# Patient Record
Sex: MALE | Race: WHITE | NOT HISPANIC OR LATINO
[De-identification: names, ages, dates, MRNs, and addresses within clinical notes are randomized per-mention and may not be internally consistent; named-entity substitution may affect disease eponyms.]

---

## 2017-01-10 ENCOUNTER — APPOINTMENT (OUTPATIENT)
Dept: CARDIOLOGY | Facility: CLINIC | Age: 42
End: 2017-01-10

## 2017-01-10 VITALS — SYSTOLIC BLOOD PRESSURE: 120 MMHG | HEART RATE: 76 BPM | RESPIRATION RATE: 18 BRPM | DIASTOLIC BLOOD PRESSURE: 80 MMHG

## 2017-01-10 VITALS — BODY MASS INDEX: 27.32 KG/M2 | WEIGHT: 170 LBS | HEIGHT: 66 IN

## 2017-06-07 ENCOUNTER — INPATIENT (INPATIENT)
Facility: HOSPITAL | Age: 42
LOS: 0 days | Discharge: HOME | End: 2017-06-08

## 2017-06-28 DIAGNOSIS — R07.9 CHEST PAIN, UNSPECIFIED: ICD-10-CM

## 2017-06-28 DIAGNOSIS — Z82.49 FAMILY HISTORY OF ISCHEMIC HEART DISEASE AND OTHER DISEASES OF THE CIRCULATORY SYSTEM: ICD-10-CM

## 2017-06-28 DIAGNOSIS — R06.02 SHORTNESS OF BREATH: ICD-10-CM

## 2017-06-28 DIAGNOSIS — R07.89 OTHER CHEST PAIN: ICD-10-CM

## 2017-06-28 DIAGNOSIS — I49.8 OTHER SPECIFIED CARDIAC ARRHYTHMIAS: ICD-10-CM

## 2017-06-28 DIAGNOSIS — E78.5 HYPERLIPIDEMIA, UNSPECIFIED: ICD-10-CM

## 2017-06-28 DIAGNOSIS — R00.2 PALPITATIONS: ICD-10-CM

## 2017-06-28 DIAGNOSIS — I10 ESSENTIAL (PRIMARY) HYPERTENSION: ICD-10-CM

## 2017-07-11 ENCOUNTER — APPOINTMENT (OUTPATIENT)
Dept: CARDIOLOGY | Facility: CLINIC | Age: 42
End: 2017-07-11

## 2017-07-31 ENCOUNTER — OUTPATIENT (OUTPATIENT)
Dept: OUTPATIENT SERVICES | Facility: HOSPITAL | Age: 42
LOS: 1 days | Discharge: HOME | End: 2017-07-31

## 2017-07-31 DIAGNOSIS — N39.0 URINARY TRACT INFECTION, SITE NOT SPECIFIED: ICD-10-CM

## 2017-11-10 ENCOUNTER — APPOINTMENT (OUTPATIENT)
Dept: UROLOGY | Facility: CLINIC | Age: 42
End: 2017-11-10

## 2017-11-15 ENCOUNTER — APPOINTMENT (OUTPATIENT)
Dept: UROLOGY | Facility: CLINIC | Age: 42
End: 2017-11-15
Payer: MEDICAID

## 2017-11-15 VITALS
WEIGHT: 170 LBS | HEART RATE: 83 BPM | HEIGHT: 66 IN | DIASTOLIC BLOOD PRESSURE: 74 MMHG | SYSTOLIC BLOOD PRESSURE: 115 MMHG | BODY MASS INDEX: 27.32 KG/M2

## 2017-11-15 DIAGNOSIS — Z82.49 FAMILY HISTORY OF ISCHEMIC HEART DISEASE AND OTHER DISEASES OF THE CIRCULATORY SYSTEM: ICD-10-CM

## 2017-11-15 DIAGNOSIS — R31.9 HEMATURIA, UNSPECIFIED: ICD-10-CM

## 2017-11-15 DIAGNOSIS — Z83.3 FAMILY HISTORY OF DIABETES MELLITUS: ICD-10-CM

## 2017-11-15 DIAGNOSIS — G90.50 COMPLEX REGIONAL PAIN SYNDROME I, UNSPECIFIED: ICD-10-CM

## 2017-11-15 DIAGNOSIS — Z83.49 FAMILY HISTORY OF OTHER ENDOCRINE, NUTRITIONAL AND METABOLIC DISEASES: ICD-10-CM

## 2017-11-15 DIAGNOSIS — Z84.1 FAMILY HISTORY OF DISORDERS OF KIDNEY AND URETER: ICD-10-CM

## 2017-11-15 PROCEDURE — 99203 OFFICE O/P NEW LOW 30 MIN: CPT

## 2017-11-15 RX ORDER — MESALAMINE 1.2 G/1
1.2 TABLET, DELAYED RELEASE ORAL DAILY
Refills: 0 | Status: COMPLETED | COMMUNITY
End: 2017-11-15

## 2017-11-15 NOTE — LETTER BODY
[Dear  ___] : Dear  [unfilled], [Attached please find my note.] : Attached please find my note. [Thank you very much for allowing me to participate in the care of this patient. If you have any questions, please do not hesitate to contact me] : Thank you very much for allowing me to participate in the care of this patient. If you have any questions, please do not hesitate to contact me. [FreeTextEntry2] : Carlos A Bauman MD\[facility]\[place], [place]

## 2017-11-15 NOTE — HISTORY OF PRESENT ILLNESS
[Urinary Retention] : urinary retention [Urinary Urgency] : urinary urgency [Urinary Frequency] : urinary frequency [Nocturia] : nocturia [Weak Stream] : weak stream [Erectile Dysfunction] : Erectile Dysfunction [Dysuria] : dysuria [None] : There is no radiation [Gradual] : gradual [___ Month(s) Ago] : [unfilled] month(s) ago [Occasional] : occasionally [Unchanged] : unchanged [FreeTextEntry1] : Fabio is very pleasant 41-year-old male who came in for several urologic issues. On the simplest side he has lower urinary tract symptoms with the AUA symptom score having the following numbers. Incomplete emptying – three\par Frequency – four,\par Intermittency – zero\par Urgency – two\par Slow stream – five\par Straining – two\par He wakes up at least once a night living in a total AUA score of 16/1 with the Q OL equal to five. Three months ago he had some blood in his urine and when he urinates now he has some initial dysuria.\par \par He has associated issues, which began about a year ago when he broke his right ankle and developed reflex sympathetic dystrophy on that side of sexual dysfunction. He is  seven years; he said different women over time and is with his current lady friend for about a year. There considering marriage and having their own child, he has a three children from his first marriage she has no children and she’s only 26. At present he can get just about hard enough to penetrate but it is easily bendable are perhaps averaging about a 60 and it takes a lot of work for her to get him hard. If he gets distracted he loses it and though with a lot of effort he can come to ejaculation it’s not really a forceful one it’s for the leaks out and he does not noticed any associated orgasm. He’s had orgasms in the past he knows what they are and would like to have them again. He can get in all the time the last a couple of minutes and he has not had a different partner since he met his current lady friend. When he self stimulates the same thing happens and he hasn’t been using audiovisual sexual stimulation for a while. If he’s tired the symptoms are even more pronounced. he does not know what precipitated this though it’s gotten gradually worse and as above begin after the reflex sympathetic dystrophy started he says his penis does and the curve or bend though they may be a slight left-sided tilt and if he had ability he want to try more often. Currently he is afraid to try because it doesn’t work. He does have sexual fantasies his girlfriend is quite sexual and enjoys intimacy.\par \par A final issue is a left small spermatocele which is quite tense and bothersome when it’s touch. He’s been told in the past it just takes some anti-inflammatories he wonders if there’s anything else we can do. He did tell me that the he may still want to have children and that obviously has a large part to play as to how we may or may not treat this this.\par \par In addition to the reflex sympathetic dystrophy he has GERD and a history of ulcerative colitis. He was on a medication named Lialda but he stopped that about three months ago with knowledge of his physician and at his own decision. He suffers from intermittent headaches and chest pain in fact was hospitalized this past summer due to cardiac symptoms which turned out to be nothing. He does not snore but he has trouble sleeping is tired in the daytime has not spoken to anyone else about this is under a lot of financial stress having an ex wife and three kids that he is raising as a single parent can make him somewhat depressed. He doesn’t drink doesn’t smoke doesn’t do drugs and works in construction.\par \par Family history is positive for hypertension, heart disease and diabetes.\par  [Hematuria - Gross] : no gross hematuria [Bladder Spasm] : no bladder spasm [Abdominal Pain] : no abdominal pain [Flank Pain] : no flank pain [Edema] : ~T edema was not present [Fever] : no fever [Fatigue] : no fatigue [Nausea] : no nausea [de-identified] : left epididymis [de-identified] : being touched [de-identified] : NSAID's

## 2017-11-15 NOTE — LETTER HEADER
[Alice Thorne MD] : Alice Thorne MD [Director of Urology] : Director of Urology [[address]] : [address] [[address]] : [address] [[place] [address]] : [place] [address] [Tel [phone]] : Tel [phone] [Fax [phone]] : Fax [phone]

## 2017-11-15 NOTE — ASSESSMENT
[FreeTextEntry1] : We are going to check his hormones as though his testicles are not really atrophied, they are the not the largest I’ve seen and if he is indeed hypogonadal fixing that may take care of a lot of this. I’m going to want [place] criteria classification from Dr. Lieberman as it may be if we can get him a better erection with less difficulty, his tension level may go down and his sex life may get better. If he is cleared, tries the PDE five inhibitors and they don’t work, again assuming he is not hypogonadal or we fixed that and he still doesn’t respond then he will need a Doppler study.\par \par As far as his urination we will get some baseline test, get it sounds a lot like tension. The test will include cytology, he ready had a urine analysis and culture back in July though we will repeat it, will keep a record of his intake and output and will consider a flow study. If I find signs of a dampened curve consider an alpha blocker.\par \par His exam does not show much in the way of atrophy. He has normal sensation with Biothesiometry equal to about 2 V. His prostate though a little large for his age is smaller that doesn’t explain the urination. He’s got a lot of problems and no obvious cause.

## 2017-11-15 NOTE — PHYSICAL EXAM
[General Appearance - Well Developed] : well developed [General Appearance - Well Nourished] : well nourished [Normal Appearance] : normal appearance [Well Groomed] : well groomed [General Appearance - In No Acute Distress] : no acute distress [Heart Rate And Rhythm] : Heart rate and rhythm were normal [Edema] : no peripheral edema [Respiration, Rhythm And Depth] : normal respiratory rhythm and effort [Exaggerated Use Of Accessory Muscles For Inspiration] : no accessory muscle use [Auscultation Breath Sounds / Voice Sounds] : lungs were clear to auscultation bilaterally [Abdomen Soft] : soft [Abdomen Tenderness] : non-tender [] : no hepato-splenomegaly [Abdomen Mass (___ Cm)] : no abdominal mass palpated [Abdomen Hernia] : no hernia was discovered [Costovertebral Angle Tenderness] : no ~M costovertebral angle tenderness [Penis Abnormality] : normal circumcised penis [Testes Tenderness] : no tenderness of the testes [Testes Mass (___cm)] : there were no testicular masses [Prostate Enlargement] : the prostate was not enlarged [No Prostate Nodules] : no prostate nodules [Prostate Size ___ gm] : prostate size [unfilled] gm [Normal Station and Gait] : the gait and station were normal for the patient's age [Oriented To Time, Place, And Person] : oriented to person, place, and time [Affect] : the affect was normal [Mood] : the mood was normal [Not Anxious] : not anxious [FreeTextEntry1] : DTR's = & 2+, biothesiometry= 2 , BC reflex intact

## 2017-11-18 ENCOUNTER — OUTPATIENT (OUTPATIENT)
Dept: OUTPATIENT SERVICES | Facility: HOSPITAL | Age: 42
LOS: 1 days | Discharge: HOME | End: 2017-11-18

## 2017-11-18 ENCOUNTER — RESULT REVIEW (OUTPATIENT)
Age: 42
End: 2017-11-18

## 2017-11-18 DIAGNOSIS — N50.819 TESTICULAR PAIN, UNSPECIFIED: ICD-10-CM

## 2017-11-18 DIAGNOSIS — R30.0 DYSURIA: ICD-10-CM

## 2017-11-18 DIAGNOSIS — E29.1 TESTICULAR HYPOFUNCTION: ICD-10-CM

## 2017-11-18 DIAGNOSIS — N52.9 MALE ERECTILE DYSFUNCTION, UNSPECIFIED: ICD-10-CM

## 2017-11-18 DIAGNOSIS — G90.50 COMPLEX REGIONAL PAIN SYNDROME I, UNSPECIFIED: ICD-10-CM

## 2017-11-18 DIAGNOSIS — R31.9 HEMATURIA, UNSPECIFIED: ICD-10-CM

## 2017-11-18 DIAGNOSIS — R39.198 OTHER DIFFICULTIES WITH MICTURITION: ICD-10-CM

## 2017-11-18 DIAGNOSIS — R33.9 RETENTION OF URINE, UNSPECIFIED: ICD-10-CM

## 2017-11-18 DIAGNOSIS — R35.0 FREQUENCY OF MICTURITION: ICD-10-CM

## 2017-11-20 ENCOUNTER — RESULT REVIEW (OUTPATIENT)
Age: 42
End: 2017-11-20

## 2017-11-20 LAB
ALBUMIN SERPL-MCNC: 4.7 G/DL
ALP SERPL-CCNC: 64 IU/L
ALT SERPL-CCNC: 15 IU/L
APPEARANCE UR: CLEAR
AST SERPL-CCNC: 21 IU/L
BACTERIA UR CULT: NORMAL
BASOPHILS # BLD: 0.04 TH/MM3
BASOPHILS NFR BLD: 0.7 %
BILIRUB DIRECT SERPL-MCNC: 0.1 MG/DL
BILIRUB SERPL-MCNC: 0.7 MG/DL
BILIRUB UR QL STRIP: NEGATIVE
COLOR UR: YELLOW
EOSINOPHIL # BLD: 0.18 TH/MM3
EOSINOPHIL NFR BLD: 3 %
ERYTHROCYTE [DISTWIDTH] IN BLOOD BY AUTOMATED COUNT: 12.2 %
ESTRADIOL FREE SERPL-MCNC: 5 PG/ML
GLUCOSE UR STRIP-MCNC: NEGATIVE MG/DL
GRANULOCYTES # BLD: 3.94 TH/MM3
GRANULOCYTES NFR BLD: 64.5 %
HCT VFR BLD AUTO: 45.2 %
HGB BLD-MCNC: 14.9 G/DL
HGB UR QL STRIP: NEGATIVE
IMM GRANULOCYTES # BLD: 0.02 TH/MM3
IMM GRANULOCYTES NFR BLD: 0.3 %
LH SERPL-ACNC: 5.1 IU/L
LYMPHOCYTES # BLD: 1.37 TH/MM3
LYMPHOCYTES NFR BLD: 22.5 %
MCH RBC QN AUTO: 29.2 PG
MCHC RBC AUTO-ENTMCNC: 33 G/DL
MCV RBC AUTO: 88.6 FL
MONOCYTES # BLD: 0.55 TH/MM3
MONOCYTES NFR BLD: 9 %
NITRITE UR QL STRIP: NEGATIVE
PH UR STRIP: 6.5
PLATELET # BLD: 270 TH/MM3
PMV BLD AUTO: 10 FL
PROLACTIN SERPL-MCNC: 10.2 NG/ML
PROT SERPL-MCNC: 7.4 G/DL
PROT UR STRIP-MCNC: NEGATIVE MG/DL
RBC # BLD AUTO: 5.1 ML/MM3
SP GR UR STRIP: 1.01
URINE KETONE (SOUTH): NEGATIVE MG/DL
UROBILINOGEN UR STRIP-MCNC: 0.2
WBC # BLD: 6.1 TH/MM3
WBC URNS QL MICRO: NEGATIVE

## 2017-11-27 LAB
ALBUMIN SERPL-MCNC: 4.7 G/DL
SHBG SERPL-SCNC: 21 NMOL/L
TESTOST FREE SERPL-MCNC: 63.8 PG/ML
TESTOST SERPL-MCNC: 356 NG/DL
TESTOSTERONE.FREE+WB SERPL-MCNC: 136.7 NG/DL

## 2017-12-18 ENCOUNTER — APPOINTMENT (OUTPATIENT)
Dept: UROLOGY | Facility: CLINIC | Age: 42
End: 2017-12-18
Payer: MEDICAID

## 2017-12-18 VITALS
WEIGHT: 160 LBS | BODY MASS INDEX: 25.71 KG/M2 | SYSTOLIC BLOOD PRESSURE: 111 MMHG | DIASTOLIC BLOOD PRESSURE: 64 MMHG | HEART RATE: 80 BPM | HEIGHT: 66 IN

## 2017-12-18 LAB
BILIRUB UR QL STRIP: NORMAL
CLARITY UR: CLEAR
COLLECTION METHOD: NORMAL
GLUCOSE UR-MCNC: NORMAL
HCG UR QL: NORMAL EU/DL
HGB UR QL STRIP.AUTO: NORMAL
KETONES UR-MCNC: NORMAL
LEUKOCYTE ESTERASE UR QL STRIP: NORMAL
NITRITE UR QL STRIP: NORMAL
PH UR STRIP: 7
PROT UR STRIP-MCNC: NORMAL
SP GR UR STRIP: 1

## 2017-12-18 PROCEDURE — 99214 OFFICE O/P EST MOD 30 MIN: CPT | Mod: 25

## 2017-12-18 PROCEDURE — 81003 URINALYSIS AUTO W/O SCOPE: CPT | Mod: QW

## 2017-12-18 PROCEDURE — 51741 ELECTRO-UROFLOWMETRY FIRST: CPT

## 2017-12-18 RX ORDER — KETOCONAZOLE 20 MG/G
2 CREAM TOPICAL
Qty: 60 | Refills: 0 | Status: COMPLETED | COMMUNITY
Start: 2017-10-17

## 2017-12-18 RX ORDER — HYDROCORTISONE 100 MG/60ML
100 ENEMA RECTAL
Qty: 420 | Refills: 0 | Status: COMPLETED | COMMUNITY
Start: 2017-03-08

## 2017-12-18 RX ORDER — HYDROCORTISONE 25 MG/G
2.5 CREAM TOPICAL
Qty: 28 | Refills: 0 | Status: COMPLETED | COMMUNITY
Start: 2017-09-19

## 2017-12-18 RX ORDER — CALCIPOTRIENE 50 UG/G
0.01 OINTMENT TOPICAL
Qty: 60 | Refills: 0 | Status: COMPLETED | COMMUNITY
Start: 2017-10-10

## 2017-12-18 RX ORDER — PREGABALIN 150 MG/1
150 CAPSULE ORAL
Qty: 60 | Refills: 0 | Status: COMPLETED | COMMUNITY
Start: 2017-06-19

## 2017-12-18 RX ORDER — CLOBETASOL PROPIONATE 0.5 MG/G
0.05 OINTMENT TOPICAL
Qty: 15 | Refills: 0 | Status: COMPLETED | COMMUNITY
Start: 2017-08-13

## 2017-12-18 RX ORDER — AMOXICILLIN AND CLAVULANATE POTASSIUM 875; 125 MG/1; MG/1
875-125 TABLET, COATED ORAL
Qty: 20 | Refills: 0 | Status: COMPLETED | COMMUNITY
Start: 2017-11-30

## 2017-12-18 RX ORDER — TACROLIMUS 1 MG/G
0.1 OINTMENT TOPICAL
Qty: 30 | Refills: 0 | Status: COMPLETED | COMMUNITY
Start: 2017-11-16

## 2017-12-18 NOTE — LETTER BODY
[FreeTextEntry2] : Carlos A Bauman MD\[address]\[place] [id number] [Dear  ___] : Dear  [unfilled], [Attached please find my note.] : Attached please find my note. [Thank you very much for allowing me to participate in the care of this patient. If you have any questions, please do not hesitate to contact me] : Thank you very much for allowing me to participate in the care of this patient. If you have any questions, please do not hesitate to contact me.

## 2017-12-18 NOTE — PHYSICAL EXAM
[General Appearance - Well Developed] : well developed [General Appearance - Well Nourished] : well nourished [Normal Appearance] : normal appearance [Well Groomed] : well groomed [General Appearance - In No Acute Distress] : no acute distress [FreeTextEntry1] : anxious [] : no respiratory distress [Respiration, Rhythm And Depth] : normal respiratory rhythm and effort [Exaggerated Use Of Accessory Muscles For Inspiration] : no accessory muscle use [Oriented To Time, Place, And Person] : oriented to person, place, and time [Affect] : the affect was normal [Mood] : the mood was normal [Not Anxious] : not anxious [No Focal Deficits] : no focal deficits

## 2017-12-18 NOTE — HISTORY OF PRESENT ILLNESS
[FreeTextEntry1] : Reinaldo was seen November 15, he has erectile dysfunction as well as lower urinary tract symptoms and he was sent for blood tests urine tests he was keep a record of his intake and output and get me his [place] criteria classification. He comes in today and he did not get the [place] criteria classification,\par We have the blood tests but he did not bring in a record of his intake and output.\par  [Urinary Urgency] : urinary urgency [Urinary Frequency] : urinary frequency [Nocturia] : nocturia [Erectile Dysfunction] : Erectile Dysfunction

## 2017-12-18 NOTE — LETTER HEADER
[Alice Thorne MD] : Alice Thorne MD [Director of Urology] : Director of Urology [[address]] : [address] [[address]] : [address] [[place] [address]] : [place] [address] [Tel [phone]] : Tel [phone] [Fax [phone]] : Fax [phone]

## 2017-12-18 NOTE — ASSESSMENT
[FreeTextEntry1] : I’m not sure what’s going on here. His hormone levels are not the highest I’ve seen but they’re high enough and here you have a 42-year-old manjula who’s telling me how bothered he is by his lack of good erections yes in a month he did not have the time to get me the [place] criteria classification. Either his life is so overwhelmingly busy or there is something else going on here. As well we look at his voiding pattern it looks more like tension rather than just a big prostate. And my Gestalt here is that his life is not in the happy place you’d like it to be. That could affect his voiding that can affect his erections and it can affect his general state of health.\par \par He’s going to keep the record of his intake and output; he’s going to get me the [place] criteria classification and will get him back in here. Depending on what I find I may recommend stress management though if he had the money and time for stress management he might not be that stressed, I may put him on one or two different medications and then hope if we get him to a happier place at least urologically we can try and taper more of them. If the medication doesn’t work or we can’t get him off of the medication that we may go ahead with a Doppler study for the penis. If the medication works but we can get him off the medication for his urination will then have to decide if he wants to stay with the medication or go for a more complete diagnosis which would be urodynamics.\par

## 2018-01-11 ENCOUNTER — APPOINTMENT (OUTPATIENT)
Dept: CARDIOLOGY | Facility: CLINIC | Age: 43
End: 2018-01-11

## 2018-01-11 VITALS — HEART RATE: 69 BPM | BODY MASS INDEX: 26.03 KG/M2 | HEIGHT: 66 IN | WEIGHT: 162 LBS

## 2018-01-11 VITALS — DIASTOLIC BLOOD PRESSURE: 80 MMHG | SYSTOLIC BLOOD PRESSURE: 120 MMHG | RESPIRATION RATE: 18 BRPM

## 2018-02-14 ENCOUNTER — APPOINTMENT (OUTPATIENT)
Dept: UROLOGY | Facility: CLINIC | Age: 43
End: 2018-02-14
Payer: MEDICAID

## 2018-02-14 VITALS
HEIGHT: 66 IN | WEIGHT: 162 LBS | HEART RATE: 88 BPM | SYSTOLIC BLOOD PRESSURE: 117 MMHG | DIASTOLIC BLOOD PRESSURE: 59 MMHG | BODY MASS INDEX: 26.03 KG/M2

## 2018-02-14 DIAGNOSIS — L25.9 UNSPECIFIED CONTACT DERMATITIS, UNSPECIFIED CAUSE: ICD-10-CM

## 2018-02-14 DIAGNOSIS — N50.819 TESTICULAR PAIN, UNSPECIFIED: ICD-10-CM

## 2018-02-14 PROCEDURE — 99214 OFFICE O/P EST MOD 30 MIN: CPT

## 2018-02-14 NOTE — LETTER HEADER
[Alice Thorne MD] : Alice Thorne MD [Director of Urology] : Director of Urology [[address]] : [address] [[address]] : [address] [[place] [address]] : [place] [address] [Tel [phone]] : Tel [phone] [Fax [phone]] : Fax [phone]

## 2018-02-14 NOTE — PHYSICAL EXAM
[General Appearance - Well Developed] : well developed [General Appearance - Well Nourished] : well nourished [Normal Appearance] : normal appearance [Well Groomed] : well groomed [General Appearance - In No Acute Distress] : no acute distress [Abdomen Soft] : soft [Abdomen Tenderness] : non-tender [Costovertebral Angle Tenderness] : no ~M costovertebral angle tenderness [Edema] : no peripheral edema [] : no respiratory distress [Respiration, Rhythm And Depth] : normal respiratory rhythm and effort [Exaggerated Use Of Accessory Muscles For Inspiration] : no accessory muscle use [Oriented To Time, Place, And Person] : oriented to person, place, and time [Affect] : the affect was normal [Mood] : the mood was normal [Not Anxious] : not anxious [Normal Station and Gait] : the gait and station were normal for the patient's age [No Focal Deficits] : no focal deficits [No Palpable Adenopathy] : no palpable adenopathy [Heart Rate And Rhythm] : Heart rate and rhythm were normal [Auscultation Breath Sounds / Voice Sounds] : lungs were clear to auscultation bilaterally [Penis Abnormality] : normal circumcised penis [Testes Tenderness] : no tenderness of the testes [FreeTextEntry1] : see  section

## 2018-02-14 NOTE — LETTER BODY
[Dear  ___] : Dear  [unfilled], [Attached please find my note.] : Attached please find my note. [Thank you very much for allowing me to participate in the care of this patient. If you have any questions, please do not hesitate to contact me] : Thank you very much for allowing me to participate in the care of this patient. If you have any questions, please do not hesitate to contact me. [FreeTextEntry2] : Joe Bauman MD\par [address] [place] [address] [address]\[place] [address] \par

## 2018-02-14 NOTE — HISTORY OF PRESENT ILLNESS
[Currently Experiencing ___] :  [unfilled] [Urinary Frequency] : urinary frequency [Erectile Dysfunction] : Erectile Dysfunction [Dysuria] : dysuria [3] : 3 [Aching] : aching [Gradual] : gradual [Moderate] : moderate in severity [Unchanged] : unchanged [None] : No relieving factors are noted [FreeTextEntry1] : BARBRA SALDANA is a 42 year old male with a past medical history of ED and LUTS . Presents to the office today for a follow up, last seen on 12/18/2018. Patient currently experiencing dysuria intermittently along with urinary frequency. No nocturia. Patient denies hematuria. Patient has brought in intake and output log and has went to see Dr Lieberman for [place] Criteria classification. Also has an erythema on the shaft and scrotum and he thinks the dysuria and dysesthesia came together\par  [Urinary Urgency] : no urinary urgency [Nocturia] : no nocturia [de-identified] : perineal / skin of the scrotum and shaft

## 2018-02-14 NOTE — ASSESSMENT
[FreeTextEntry1] : Patient intake and output log shows no nocturia and urinating during the day between every 2 to 3 hours.However  he's taking in 50% less than he's putting out so this is just a  poor record taking. Given that  his flow study showed more stress than anything else  biofeedback  may be useful. However it will be hard for him to do that so we will try alpha blockers and see if that helps.\par \par With respect to his skin rash, he will try some topical 1% hydrocortisone. Concurrently he will change from a detergent based soap for washing his close to one that is more skin friendly such as what is used for babies. He will see what’s on sale / see if that helps and if not he will need to see a dermatologist.\par \par Patient was seen by Dr. Lieberman on 1/11/2018 and considered to be low cardiac risk for ED drugs, an EKG performed and Stress Test both normal. Patient is to follow up with an Echo in 4 months.

## 2018-02-14 NOTE — REVIEW OF SYSTEMS
[see HPI] : see HPI [Negative] : Heme/Lymph [Dysuria] : no dysuria [Incontinence] : no incontinence [Nocturia] : no nocturia [Testicular Pain] : no testicular pain [Erectile Dysfunction] : erectile dysfunction

## 2018-03-10 ENCOUNTER — APPOINTMENT (OUTPATIENT)
Dept: CARDIOLOGY | Facility: CLINIC | Age: 43
End: 2018-03-10

## 2018-04-05 ENCOUNTER — APPOINTMENT (OUTPATIENT)
Dept: UROLOGY | Facility: CLINIC | Age: 43
End: 2018-04-05
Payer: MEDICAID

## 2018-04-05 VITALS
HEIGHT: 66 IN | HEART RATE: 73 BPM | SYSTOLIC BLOOD PRESSURE: 110 MMHG | BODY MASS INDEX: 26.03 KG/M2 | DIASTOLIC BLOOD PRESSURE: 62 MMHG | WEIGHT: 162 LBS

## 2018-04-05 DIAGNOSIS — R33.9 RETENTION OF URINE, UNSPECIFIED: ICD-10-CM

## 2018-04-05 DIAGNOSIS — R35.0 FREQUENCY OF MICTURITION: ICD-10-CM

## 2018-04-05 DIAGNOSIS — R30.0 DYSURIA: ICD-10-CM

## 2018-04-05 DIAGNOSIS — N52.9 MALE ERECTILE DYSFUNCTION, UNSPECIFIED: ICD-10-CM

## 2018-04-05 DIAGNOSIS — R39.198 OTHER DIFFICULTIES WITH MICTURITION: ICD-10-CM

## 2018-04-05 PROCEDURE — 99212 OFFICE O/P EST SF 10 MIN: CPT

## 2018-04-05 RX ORDER — CALCIPOTRIENE 50 UG/G
0.01 CREAM TOPICAL
Qty: 60 | Refills: 0 | Status: COMPLETED | COMMUNITY
Start: 2018-02-22

## 2018-04-05 RX ORDER — MUPIROCIN 20 MG/G
2 OINTMENT TOPICAL
Qty: 22 | Refills: 0 | Status: COMPLETED | COMMUNITY
Start: 2018-03-09

## 2018-04-05 NOTE — HISTORY OF PRESENT ILLNESS
[FreeTextEntry1] : He tells me that he changed his detergent and it did not help and therefore as prearranged he went to a dermatologist. He was given an antifungal in conjunction with the steroid which helped and after two weeks of therapy was switched to topical steroids which are not working as well, something for which he needs to go back to the dermatologist. With respect to his sex life using to the 20 mg tablets has him working perfectly. Taking the Flomax has him urinating perfectly so practically speaking he feels he has no further urologic issues. [None] : no symptoms

## 2018-04-05 NOTE — PHYSICAL EXAM
[General Appearance - Well Developed] : well developed [General Appearance - Well Nourished] : well nourished [Normal Appearance] : normal appearance [Well Groomed] : well groomed [General Appearance - In No Acute Distress] : no acute distress [] : no respiratory distress [Respiration, Rhythm And Depth] : normal respiratory rhythm and effort [Exaggerated Use Of Accessory Muscles For Inspiration] : no accessory muscle use [Oriented To Time, Place, And Person] : oriented to person, place, and time [Affect] : the affect was normal [Mood] : the mood was normal [Not Anxious] : not anxious

## 2018-04-05 NOTE — LETTER BODY
[Dear  ___] : Dear  [unfilled], [Courtesy Letter:] : I had the pleasure of seeing your patient, [unfilled], in my office today. [Please see my note below.] : Please see my note below. [Sincerely,] : Sincerely, [FreeTextEntry2] : Joe Bauman MD\[address]\[place] [id number]

## 2018-04-05 NOTE — ASSESSMENT
[FreeTextEntry1] : He has had an excellent response to the medication without side effects. As far as the discomfort in his genitalia he will follow with the dermatologist and see what they can do to help him. As I explained to him this is a disease of the skin that happens to be located by his penis not a disease of the urinary tract

## 2018-04-05 NOTE — REVIEW OF SYSTEMS
[see HPI] : see HPI [Dysuria] : no dysuria [Incontinence] : no incontinence [Nocturia] : no nocturia [Testicular Pain] : no testicular pain [Erectile Dysfunction] : erectile dysfunction [Negative] : Heme/Lymph

## 2018-05-16 ENCOUNTER — APPOINTMENT (OUTPATIENT)
Dept: CARDIOLOGY | Facility: CLINIC | Age: 43
End: 2018-05-16

## 2018-07-09 ENCOUNTER — APPOINTMENT (OUTPATIENT)
Dept: UROLOGY | Facility: CLINIC | Age: 43
End: 2018-07-09

## 2018-07-20 ENCOUNTER — OUTPATIENT (OUTPATIENT)
Dept: OUTPATIENT SERVICES | Facility: HOSPITAL | Age: 43
LOS: 1 days | Discharge: HOME | End: 2018-07-20

## 2018-07-20 ENCOUNTER — RESULT REVIEW (OUTPATIENT)
Age: 43
End: 2018-07-20

## 2018-07-23 DIAGNOSIS — R89.7 ABNORMAL HISTOLOGICAL FINDINGS IN SPECIMENS FROM OTHER ORGANS, SYSTEMS AND TISSUES: ICD-10-CM

## 2018-08-06 ENCOUNTER — OUTPATIENT (OUTPATIENT)
Dept: OUTPATIENT SERVICES | Facility: HOSPITAL | Age: 43
LOS: 1 days | Discharge: HOME | End: 2018-08-06

## 2018-08-06 DIAGNOSIS — K51.20 ULCERATIVE (CHRONIC) PROCTITIS WITHOUT COMPLICATIONS: ICD-10-CM

## 2019-06-05 ENCOUNTER — OUTPATIENT (OUTPATIENT)
Dept: OUTPATIENT SERVICES | Facility: HOSPITAL | Age: 44
LOS: 1 days | Discharge: HOME | End: 2019-06-05

## 2019-06-12 ENCOUNTER — OUTPATIENT (OUTPATIENT)
Dept: OUTPATIENT SERVICES | Facility: HOSPITAL | Age: 44
LOS: 1 days | Discharge: HOME | End: 2019-06-12

## 2019-06-12 DIAGNOSIS — K01.1 IMPACTED TEETH: ICD-10-CM

## 2019-07-12 ENCOUNTER — APPOINTMENT (OUTPATIENT)
Dept: CARDIOLOGY | Facility: CLINIC | Age: 44
End: 2019-07-12
Payer: MEDICAID

## 2019-07-12 ENCOUNTER — OTHER (OUTPATIENT)
Age: 44
End: 2019-07-12

## 2019-07-12 VITALS — HEART RATE: 76 BPM | DIASTOLIC BLOOD PRESSURE: 80 MMHG | RESPIRATION RATE: 18 BRPM | SYSTOLIC BLOOD PRESSURE: 130 MMHG

## 2019-07-12 VITALS — WEIGHT: 192 LBS | BODY MASS INDEX: 30.86 KG/M2 | HEIGHT: 66 IN

## 2019-07-12 DIAGNOSIS — R06.02 SHORTNESS OF BREATH: ICD-10-CM

## 2019-07-12 PROCEDURE — 93000 ELECTROCARDIOGRAM COMPLETE: CPT

## 2019-07-12 PROCEDURE — 99214 OFFICE O/P EST MOD 30 MIN: CPT

## 2019-07-12 NOTE — PHYSICAL EXAM
[Normal Appearance] : normal appearance [General Appearance - Well Developed] : well developed [No Deformities] : no deformities [General Appearance - Well Nourished] : well nourished [Well Groomed] : well groomed [General Appearance - In No Acute Distress] : no acute distress [Normal Conjunctiva] : the conjunctiva exhibited no abnormalities [Eyelids - No Xanthelasma] : the eyelids demonstrated no xanthelasmas [No Oral Cyanosis] : no oral cyanosis [Normal Oral Mucosa] : normal oral mucosa [No Oral Pallor] : no oral pallor [Normal Jugular Venous V Waves Present] : normal jugular venous V waves present [Normal Jugular Venous A Waves Present] : normal jugular venous A waves present [No Jugular Venous Garces A Waves] : no jugular venous garces A waves [Heart Rate And Rhythm] : heart rate and rhythm were normal [Heart Sounds] : normal S1 and S2 [Murmurs] : no murmurs present [Exaggerated Use Of Accessory Muscles For Inspiration] : no accessory muscle use [Respiration, Rhythm And Depth] : normal respiratory rhythm and effort [Auscultation Breath Sounds / Voice Sounds] : lungs were clear to auscultation bilaterally [Bowel Sounds] : normal bowel sounds [Abdomen Soft] : soft [Abdomen Mass (___ Cm)] : no abdominal mass palpated [Abdomen Tenderness] : non-tender [Abnormal Walk] : normal gait [Gait - Sufficient For Exercise Testing] : the gait was sufficient for exercise testing [Nail Clubbing] : no clubbing of the fingernails [Cyanosis, Localized] : no localized cyanosis [Petechial Hemorrhages (___cm)] : no petechial hemorrhages [No Venous Stasis] : no venous stasis [Skin Lesions] : no skin lesions [Skin Color & Pigmentation] : normal skin color and pigmentation [] : no rash [No Xanthoma] : no  xanthoma was observed [No Skin Ulcers] : no skin ulcer [Oriented To Time, Place, And Person] : oriented to person, place, and time

## 2019-08-12 ENCOUNTER — APPOINTMENT (OUTPATIENT)
Dept: CARDIOLOGY | Facility: CLINIC | Age: 44
End: 2019-08-12
Payer: MEDICAID

## 2019-08-12 PROCEDURE — 93306 TTE W/DOPPLER COMPLETE: CPT

## 2019-08-12 PROCEDURE — 93015 CV STRESS TEST SUPVJ I&R: CPT

## 2019-08-14 ENCOUNTER — APPOINTMENT (OUTPATIENT)
Dept: CARDIOLOGY | Facility: CLINIC | Age: 44
End: 2019-08-14

## 2019-08-14 ENCOUNTER — APPOINTMENT (OUTPATIENT)
Dept: CARDIOLOGY | Facility: CLINIC | Age: 44
End: 2019-08-14
Payer: MEDICAID

## 2019-08-14 DIAGNOSIS — I10 ESSENTIAL (PRIMARY) HYPERTENSION: ICD-10-CM

## 2019-08-14 DIAGNOSIS — I34.0 NONRHEUMATIC MITRAL (VALVE) INSUFFICIENCY: ICD-10-CM

## 2019-08-14 DIAGNOSIS — R07.9 CHEST PAIN, UNSPECIFIED: ICD-10-CM

## 2019-08-14 DIAGNOSIS — E78.5 HYPERLIPIDEMIA, UNSPECIFIED: ICD-10-CM

## 2019-08-14 PROCEDURE — 93351 STRESS TTE COMPLETE: CPT

## 2019-08-17 PROBLEM — R07.9 CHEST PAIN: Status: ACTIVE | Noted: 2019-07-12

## 2019-11-11 ENCOUNTER — APPOINTMENT (OUTPATIENT)
Dept: CARDIOLOGY | Facility: CLINIC | Age: 44
End: 2019-11-11

## 2019-11-26 ENCOUNTER — APPOINTMENT (OUTPATIENT)
Dept: CARDIOLOGY | Facility: CLINIC | Age: 44
End: 2019-11-26

## 2021-09-13 ENCOUNTER — EMERGENCY (EMERGENCY)
Facility: HOSPITAL | Age: 46
LOS: 0 days | Discharge: HOME | End: 2021-09-13
Attending: EMERGENCY MEDICINE | Admitting: EMERGENCY MEDICINE
Payer: OTHER MISCELLANEOUS

## 2021-09-13 VITALS
HEART RATE: 82 BPM | SYSTOLIC BLOOD PRESSURE: 128 MMHG | DIASTOLIC BLOOD PRESSURE: 74 MMHG | RESPIRATION RATE: 18 BRPM | OXYGEN SATURATION: 98 %

## 2021-09-13 VITALS
OXYGEN SATURATION: 98 % | RESPIRATION RATE: 18 BRPM | TEMPERATURE: 98 F | DIASTOLIC BLOOD PRESSURE: 97 MMHG | HEART RATE: 88 BPM | SYSTOLIC BLOOD PRESSURE: 134 MMHG

## 2021-09-13 DIAGNOSIS — M25.561 PAIN IN RIGHT KNEE: ICD-10-CM

## 2021-09-13 DIAGNOSIS — M25.461 EFFUSION, RIGHT KNEE: ICD-10-CM
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
## 2021-09-13 PROCEDURE — 99283 EMERGENCY DEPT VISIT LOW MDM: CPT

## 2021-09-13 PROCEDURE — 73564 X-RAY EXAM KNEE 4 OR MORE: CPT | Mod: 26,RT

## 2021-09-13 RX ORDER — IBUPROFEN 200 MG
600 TABLET ORAL ONCE
Refills: 0 | Status: COMPLETED | OUTPATIENT
Start: 2021-09-13 | End: 2021-09-13

## 2021-09-13 RX ADMIN — Medication 600 MILLIGRAM(S): at 13:59

## 2021-09-13 NOTE — ED PROVIDER NOTE - NSFOLLOWUPINSTRUCTIONS_ED_ALL_ED_FT

## 2021-09-13 NOTE — ED PROVIDER NOTE - PATIENT PORTAL LINK FT
You can access the FollowMyHealth Patient Portal offered by [facility] by registering at the following website: [email or web address]. By joining [facility]’s FollowMyHealth portal, you will also be able to view your health information using other applications (apps) compatible with our system.

## 2021-09-13 NOTE — ED PROVIDER NOTE - OBJECTIVE STATEMENT
This is a 45 year old male with no PMHx who presented to the hospital with knee pain. The patient reports that he was working as a contractor demoing a wooden deck. States he stepped down and his leg fell through a rotted piece of wood. States his knee sort of got lodged with the wooden piece falling on top of his knee. The patient reports feeling pain immediately after this but does not recall hearing/feeling any pops in the knee. States he had immediate swelling of the knee with difficulty bearing weight on it. States he kept ice on his knee and came to the hospital a few hours after this occurred.

## 2021-09-13 NOTE — ED ADULT NURSE NOTE - OBJECTIVE STATEMENT
Pt presented to ED c/o knee pain. Pt c/o R knee pain x1 day after falling off wooden deck. Denies n/v/d/fevers/chills. Pt denies AC use/LOC(-). Pt denies HT(-). Pt A&Ox4, ambulatory baseline.

## 2021-09-13 NOTE — ED PROVIDER NOTE - PHYSICAL EXAMINATION
T(C): 36.7 (09-13-21 @ 12:42), Max: 36.7 (09-13-21 @ 12:42)  HR: 88 (09-13-21 @ 12:42) (88 - 88)  BP: 134/97 (09-13-21 @ 12:42) (134/97 - 134/97)  RR: 18 (09-13-21 @ 12:42) (18 - 18)  SpO2: 98% (09-13-21 @ 12:42) (98% - 98%)    PHYSICAL EXAM:  GENERAL: NAD, well-developed, appearing stated age   HEAD:  Atraumatic, Normocephalic  EYES: EOMI, PERRLA, conjunctiva and sclera clear  ENT:No nasal obstruction or discharge. No tonsillar exudate, swelling or erythema.  NECK: Supple, No JVD  CHEST/LUNG: Clear to auscultation bilaterally; No wheeze, rhonchi, rales   HEART: Regular rate and rhythm; No murmurs, rubs, or gallops  ABDOMEN: Soft, Nontender, Nondistended; Bowel sounds present  EXTREMITIES:  2+ Peripheral Pulses, Right knee with effusion on medial aspect, No laxity, Valgus and Varus negative, Lachman negative.   PSYCH: AAOx3  NEUROLOGY: non-focal, sensation equal and intact bilaterally, cranial nerves ii-xii grossly intact, muscle strength 5/5 bilaterally   SKIN: No rashes or lesions

## 2021-09-13 NOTE — ED PROVIDER NOTE - CARE PLAN
1 Principal Discharge DX:	Knee pain  Goal:	To get better  Assessment and plan of treatment:	x-rays of your knee were negative for a fracture. Your right knee was wrapped with an ACE wrap. You were given some ibuprofen. Please continue to take ibuprofen to help with swelling. Please follow up with orthopedics. A doctor's name was given to you

## 2021-09-13 NOTE — ED PROVIDER NOTE - PLAN OF CARE
To get better x-rays of your knee were negative for a fracture. Your right knee was wrapped with an ACE wrap. You were given some ibuprofen. Please continue to take ibuprofen to help with swelling. Please follow up with orthopedics. A doctor's name was given to you

## 2021-09-13 NOTE — ED PROVIDER NOTE - CARE PROVIDER_API CALL
Willis De Leon (MD)  Orthopaedic Surgery  [address] [place] [address]  Phone: [phone]  Fax: [phone]  Follow Up Time:

## 2021-09-13 NOTE — ED PROVIDER NOTE - NS ED ROS FT
General: No fevers, chills, weight changes	  Skin/Breast: No skin rashes or wounds  Ophthalmologic: No blurry vision, double vision, recent changes in vision  ENMT: No difficulty hearing, ringing in ears, nasal discharge, throat pain, difficulty swallowing  Respiratory and Thorax: No coughing, wheezing, shortness of breath  Cardiovascular: No chest pain, palpitations  Gastrointestinal: No abdominal pain, constipation, diarrhea, nausea, vomiting  Genitourinary: No dysuria, polyuria, pyuria, hematuria  Musculoskeletal: Rt knee pain   Neurological: No numbness or tingling  Psychiatric: Regular mood  Hematology/Lymphatics: No easy bruising	  Endocrine: No hot or cold intolerance

## 2021-09-13 NOTE — ED PROVIDER NOTE - ATTENDING CONTRIBUTION TO CARE
44 y/o male no sig PMH, rt ankle surgery p/w rt knee pain x PTA, states leg fell through deck while at work (pt's body didn't fall through, sx are constant, worse with certain movements and position, better with rest, denies paresthesias, focal weakness, or other associated complaints at present.     No old chart available for review.  I have reviewed and agree with the initial nursing note, except as documented in my note.    VSS, awake, alert, in NAD, no skin lacerations or abrasions, RLE; no hip, ankle or foot tenderness, rt knee; medial swelling and ttp, no gross deformity, swelling, crepitus, joint line tenderness, able to fully flex and extend knee without encouragement, no joint laxity noted on varus or valgus stress at 0 and 30 degrees, normal Lachman and posterior drawer, motor and sensation intact distally, NV intact with 2+ DP pulses, normal gait.    XR neg for fx, recommend supportive care measures including RICE, outpt f/u next available appt for further evaluation and management. The patient was given detailed return precautions and advised to return to the emergency department if any new symptoms developed, symptoms worsened or for any concerns. The patient was offered the opportunity to ask questions and verbalized that they understand the diagnosis and discharge instructions.